# Patient Record
Sex: MALE | Race: WHITE | Employment: OTHER | URBAN - METROPOLITAN AREA
[De-identification: names, ages, dates, MRNs, and addresses within clinical notes are randomized per-mention and may not be internally consistent; named-entity substitution may affect disease eponyms.]

---

## 2017-02-13 ENCOUNTER — OP HISTORICAL/CONVERTED ENCOUNTER (OUTPATIENT)
Dept: OTHER | Age: 82
End: 2017-02-13

## 2017-03-16 ENCOUNTER — OP HISTORICAL/CONVERTED ENCOUNTER (OUTPATIENT)
Dept: OTHER | Age: 82
End: 2017-03-16

## 2017-04-03 ENCOUNTER — OP HISTORICAL/CONVERTED ENCOUNTER (OUTPATIENT)
Dept: OTHER | Age: 82
End: 2017-04-03

## 2017-08-28 ENCOUNTER — OP HISTORICAL/CONVERTED ENCOUNTER (OUTPATIENT)
Dept: OTHER | Age: 82
End: 2017-08-28

## 2017-12-15 ENCOUNTER — OP HISTORICAL/CONVERTED ENCOUNTER (OUTPATIENT)
Dept: OTHER | Age: 82
End: 2017-12-15

## 2018-08-07 ENCOUNTER — OP HISTORICAL/CONVERTED ENCOUNTER (OUTPATIENT)
Dept: OTHER | Age: 83
End: 2018-08-07

## 2019-03-12 ENCOUNTER — OP HISTORICAL/CONVERTED ENCOUNTER (OUTPATIENT)
Dept: OTHER | Age: 84
End: 2019-03-12

## 2019-07-02 ENCOUNTER — OP HISTORICAL/CONVERTED ENCOUNTER (OUTPATIENT)
Dept: OTHER | Age: 84
End: 2019-07-02

## 2019-08-22 ENCOUNTER — OP HISTORICAL/CONVERTED ENCOUNTER (OUTPATIENT)
Dept: OTHER | Age: 84
End: 2019-08-22

## 2020-02-05 ENCOUNTER — OP HISTORICAL/CONVERTED ENCOUNTER (OUTPATIENT)
Dept: OTHER | Age: 85
End: 2020-02-05

## 2020-03-03 ENCOUNTER — OP HISTORICAL/CONVERTED ENCOUNTER (OUTPATIENT)
Dept: OTHER | Age: 85
End: 2020-03-03

## 2020-07-13 ENCOUNTER — ED HISTORICAL/CONVERTED ENCOUNTER (OUTPATIENT)
Dept: OTHER | Age: 85
End: 2020-07-13

## 2020-08-24 ENCOUNTER — OP HISTORICAL/CONVERTED ENCOUNTER (OUTPATIENT)
Dept: OTHER | Age: 85
End: 2020-08-24

## 2020-10-12 ENCOUNTER — TRANSCRIBE ORDER (OUTPATIENT)
Dept: SCHEDULING | Age: 85
End: 2020-10-12

## 2020-10-12 DIAGNOSIS — R06.02 SHORTNESS OF BREATH: Primary | ICD-10-CM

## 2020-11-18 ENCOUNTER — HOSPITAL ENCOUNTER (OUTPATIENT)
Dept: CT IMAGING | Age: 85
Discharge: HOME | End: 2020-11-18
Attending: INTERNAL MEDICINE
Payer: MEDICARE

## 2020-11-18 DIAGNOSIS — R06.02 SHORTNESS OF BREATH: ICD-10-CM

## 2020-11-18 PROCEDURE — 71250 CT THORAX DX C-: CPT

## 2021-07-19 ENCOUNTER — TRANSCRIBE ORDER (OUTPATIENT)
Dept: SCHEDULING | Age: 86
End: 2021-07-19

## 2021-07-19 DIAGNOSIS — R91.1 RIGHT UPPER LOBE PULMONARY NODULE: Primary | ICD-10-CM

## 2021-11-16 ENCOUNTER — TRANSCRIBE ORDER (OUTPATIENT)
Dept: SCHEDULING | Age: 86
End: 2021-11-16

## 2021-11-16 DIAGNOSIS — R91.1 RIGHT UPPER LOBE PULMONARY NODULE: Primary | ICD-10-CM

## 2021-11-19 ENCOUNTER — HOSPITAL ENCOUNTER (OUTPATIENT)
Dept: CT IMAGING | Age: 86
Discharge: HOME | End: 2021-11-19
Attending: INTERNAL MEDICINE
Payer: MEDICARE

## 2021-11-19 DIAGNOSIS — R91.1 RIGHT UPPER LOBE PULMONARY NODULE: ICD-10-CM

## 2021-11-19 PROCEDURE — 71250 CT THORAX DX C-: CPT

## 2022-10-31 ENCOUNTER — TRANSCRIBE ORDER (OUTPATIENT)
Dept: SCHEDULING | Age: 87
End: 2022-10-31

## 2022-10-31 DIAGNOSIS — R91.1 RIGHT UPPER LOBE PULMONARY NODULE: Primary | ICD-10-CM

## 2022-11-22 ENCOUNTER — HOSPITAL ENCOUNTER (OUTPATIENT)
Dept: CT IMAGING | Age: 87
Discharge: HOME | End: 2022-11-22
Attending: INTERNAL MEDICINE
Payer: MEDICARE

## 2022-11-22 DIAGNOSIS — R91.1 RIGHT UPPER LOBE PULMONARY NODULE: ICD-10-CM

## 2022-11-22 PROCEDURE — 71250 CT THORAX DX C-: CPT

## 2022-11-30 RX ORDER — ASPIRIN 81 MG/1
TABLET ORAL DAILY
COMMUNITY

## 2022-12-05 ENCOUNTER — ANESTHESIA EVENT (OUTPATIENT)
Dept: ENDOSCOPY | Age: 87
End: 2022-12-05
Payer: MEDICARE

## 2022-12-05 ENCOUNTER — ANESTHESIA (OUTPATIENT)
Dept: ENDOSCOPY | Age: 87
End: 2022-12-05
Payer: MEDICARE

## 2022-12-05 ENCOUNTER — APPOINTMENT (OUTPATIENT)
Dept: ENDOSCOPY | Age: 87
End: 2022-12-05
Attending: INTERNAL MEDICINE
Payer: MEDICARE

## 2022-12-05 ENCOUNTER — HOSPITAL ENCOUNTER (OUTPATIENT)
Age: 87
Setting detail: OUTPATIENT SURGERY
Discharge: HOME | End: 2022-12-05
Attending: INTERNAL MEDICINE | Admitting: INTERNAL MEDICINE
Payer: MEDICARE

## 2022-12-05 VITALS
TEMPERATURE: 97.3 F | HEART RATE: 58 BPM | SYSTOLIC BLOOD PRESSURE: 171 MMHG | BODY MASS INDEX: 33.33 KG/M2 | DIASTOLIC BLOOD PRESSURE: 89 MMHG | OXYGEN SATURATION: 96 % | RESPIRATION RATE: 16 BRPM | WEIGHT: 225 LBS | HEIGHT: 69 IN

## 2022-12-05 PROCEDURE — 74011250636 HC RX REV CODE- 250/636: Performed by: NURSE PRACTITIONER

## 2022-12-05 PROCEDURE — 77030019988 HC FCPS ENDOSC DISP BSC -B: Performed by: INTERNAL MEDICINE

## 2022-12-05 PROCEDURE — 2709999900 HC NON-CHARGEABLE SUPPLY: Performed by: INTERNAL MEDICINE

## 2022-12-05 PROCEDURE — 88305 TISSUE EXAM BY PATHOLOGIST: CPT

## 2022-12-05 PROCEDURE — 76060000032 HC ANESTHESIA 0.5 TO 1 HR: Performed by: INTERNAL MEDICINE

## 2022-12-05 PROCEDURE — 76040000007: Performed by: INTERNAL MEDICINE

## 2022-12-05 PROCEDURE — 74011250636 HC RX REV CODE- 250/636: Performed by: INTERNAL MEDICINE

## 2022-12-05 RX ORDER — SODIUM CHLORIDE, SODIUM LACTATE, POTASSIUM CHLORIDE, CALCIUM CHLORIDE 600; 310; 30; 20 MG/100ML; MG/100ML; MG/100ML; MG/100ML
INJECTION, SOLUTION INTRAVENOUS
Status: DISCONTINUED | OUTPATIENT
Start: 2022-12-05 | End: 2022-12-05 | Stop reason: HOSPADM

## 2022-12-05 RX ORDER — LOSARTAN POTASSIUM 50 MG/1
TABLET ORAL DAILY
COMMUNITY

## 2022-12-05 RX ORDER — HYDROCHLOROTHIAZIDE 12.5 MG/1
12.5 TABLET ORAL DAILY
COMMUNITY

## 2022-12-05 RX ORDER — PROPOFOL 10 MG/ML
INJECTION, EMULSION INTRAVENOUS AS NEEDED
Status: DISCONTINUED | OUTPATIENT
Start: 2022-12-05 | End: 2022-12-05 | Stop reason: HOSPADM

## 2022-12-05 RX ORDER — TAMSULOSIN HYDROCHLORIDE 0.4 MG/1
0.4 CAPSULE ORAL DAILY
COMMUNITY

## 2022-12-05 RX ORDER — CHOLECALCIFEROL (VITAMIN D3) 50 MCG
CAPSULE ORAL DAILY
COMMUNITY

## 2022-12-05 RX ORDER — ALLOPURINOL 300 MG/1
TABLET ORAL DAILY
COMMUNITY

## 2022-12-05 RX ORDER — ESCITALOPRAM OXALATE 10 MG/1
10 TABLET ORAL DAILY
COMMUNITY

## 2022-12-05 RX ORDER — GABAPENTIN 300 MG/1
300 CAPSULE ORAL 3 TIMES DAILY
COMMUNITY

## 2022-12-05 RX ORDER — SODIUM CHLORIDE, SODIUM LACTATE, POTASSIUM CHLORIDE, CALCIUM CHLORIDE 600; 310; 30; 20 MG/100ML; MG/100ML; MG/100ML; MG/100ML
50 INJECTION, SOLUTION INTRAVENOUS CONTINUOUS
Status: DISCONTINUED | OUTPATIENT
Start: 2022-12-05 | End: 2022-12-05 | Stop reason: HOSPADM

## 2022-12-05 RX ORDER — LANOLIN ALCOHOL/MO/W.PET/CERES
CREAM (GRAM) TOPICAL
COMMUNITY

## 2022-12-05 RX ORDER — ATORVASTATIN CALCIUM 10 MG/1
10 TABLET, FILM COATED ORAL DAILY
COMMUNITY

## 2022-12-05 RX ADMIN — PROPOFOL 30 MG: 10 INJECTION, EMULSION INTRAVENOUS at 11:17

## 2022-12-05 RX ADMIN — SODIUM CHLORIDE, POTASSIUM CHLORIDE, SODIUM LACTATE AND CALCIUM CHLORIDE 50 ML/HR: 600; 310; 30; 20 INJECTION, SOLUTION INTRAVENOUS at 10:17

## 2022-12-05 RX ADMIN — SODIUM CHLORIDE, POTASSIUM CHLORIDE, SODIUM LACTATE AND CALCIUM CHLORIDE: 600; 310; 30; 20 INJECTION, SOLUTION INTRAVENOUS at 11:09

## 2022-12-05 RX ADMIN — PROPOFOL 50 MG: 10 INJECTION, EMULSION INTRAVENOUS at 11:13

## 2022-12-05 RX ADMIN — PROPOFOL 50 MG: 10 INJECTION, EMULSION INTRAVENOUS at 11:11

## 2022-12-05 NOTE — ANESTHESIA POSTPROCEDURE EVALUATION
Procedure(s):  COLONOSCOPY  ENDOSCOPIC POLYPECTOMY. MAC, total IV anesthesia    Anesthesia Post Evaluation      Multimodal analgesia: multimodal analgesia not used between 6 hours prior to anesthesia start to PACU discharge  Patient location during evaluation: bedside (Endoscopy suite)  Patient participation: complete - patient cannot participate  Level of consciousness: sleepy but conscious  Pain score: 0  Pain management: adequate  Airway patency: patent  Anesthetic complications: no  Cardiovascular status: acceptable  Respiratory status: acceptable and nasal cannula  Hydration status: acceptable  Comments: This patient remained on the stretcher. The patient was handed off to the endoscopy nursing team.  All questions regarding pre-, intra-, and postoperative care were answered.   Post anesthesia nausea and vomiting:  none      INITIAL Post-op Vital signs:   Vitals Value Taken Time   /86 12/05/22 1133   Temp 36.3 °C (97.3 °F) 12/05/22 1133   Pulse 74 12/05/22 1133   Resp 16 12/05/22 1133   SpO2 98 % 12/05/22 1133

## 2022-12-05 NOTE — ANESTHESIA PREPROCEDURE EVALUATION
Relevant Problems   No relevant active problems       Anesthetic History   No history of anesthetic complications            Review of Systems / Medical History  Patient summary reviewed and pertinent labs reviewed    Pulmonary        Sleep apnea    Asthma   Pertinent negatives: No smoker     Neuro/Psych   Within defined limits           Cardiovascular    Hypertension          CAD         GI/Hepatic/Renal  Within defined limits              Endo/Other        Obesity     Other Findings            Past Medical History:   Diagnosis Date    Arthritis     Asthma     CAD (coronary artery disease)     Depression     Gout     Hyperglycemia     Hyperlipidemia     Hypertension     Sleep apnea     Spinal stenosis        Past Surgical History:   Procedure Laterality Date    HX ORTHOPAEDIC      knee replacements       Current Outpatient Medications   Medication Instructions    aspirin delayed-release 81 mg tablet Oral, DAILY       No current facility-administered medications for this encounter. Current Outpatient Medications   Medication Sig    aspirin delayed-release 81 mg tablet Take  by mouth daily. No data found.     No results found for: WBC, WBCLT, HGBPOC, HGB, HGBP, HCTPOC, HCT, PHCT, RBCH, PLT, MCV, HGBEXT, HCTEXT, PLTEXT  No results found for: NA, K, CL, CO2, AGAP, GLU, BUN, CREA, BUCR, GFRAA, GFRNA, CA, GFRAA  No results found for: APTT, PTP, INR, INREXT  No results found for: GLU, GLUCPOC      Physical Exam    Airway  Mallampati: III    Neck ROM: normal range of motion        Cardiovascular    Rhythm: regular  Rate: normal         Dental         Pulmonary  Breath sounds clear to auscultation               Abdominal         Other Findings            Anesthetic Plan    ASA: 3  Anesthesia type: MAC and total IV anesthesia    Monitoring Plan: Continuous noninvasive hemodynamic monitoring      Induction: Intravenous  Anesthetic plan and risks discussed with: Patient

## 2023-01-27 ENCOUNTER — TRANSCRIBE ORDER (OUTPATIENT)
Dept: SCHEDULING | Age: 88
End: 2023-01-27

## 2023-01-27 DIAGNOSIS — R91.8 LUNG NODULES: Primary | ICD-10-CM

## 2023-02-20 ENCOUNTER — TRANSCRIBE ORDER (OUTPATIENT)
Dept: SCHEDULING | Age: 88
End: 2023-02-20

## 2023-02-20 DIAGNOSIS — M54.12 CERVICAL RADICULOPATHY: Primary | ICD-10-CM

## 2023-03-10 ENCOUNTER — TRANSCRIBE ORDER (OUTPATIENT)
Dept: SCHEDULING | Age: 88
End: 2023-03-10

## 2023-03-10 DIAGNOSIS — R91.8 LUNG NODULES: Primary | ICD-10-CM

## 2023-04-19 ENCOUNTER — OFFICE VISIT (OUTPATIENT)
Dept: PODIATRY | Age: 88
End: 2023-04-19

## 2023-04-19 VITALS
HEIGHT: 69 IN | SYSTOLIC BLOOD PRESSURE: 173 MMHG | HEART RATE: 59 BPM | TEMPERATURE: 97.3 F | DIASTOLIC BLOOD PRESSURE: 77 MMHG | WEIGHT: 225 LBS | OXYGEN SATURATION: 96 % | BODY MASS INDEX: 33.33 KG/M2

## 2023-04-19 DIAGNOSIS — L60.2 HYPERTROPHY OF NAIL: Primary | ICD-10-CM

## 2023-04-19 NOTE — PROGRESS NOTES
1. Have you been to the ER, urgent care clinic since your last visit? Hospitalized since your last visit? No    2. Have you seen or consulted any other health care providers outside of the [address] [place] [address] since your last visit? Include any pap smears or colon screening.  No    Chief Complaint   Patient presents with    Foot Exam    Ingrown Toenail     Bilateral great toe

## 2023-04-19 NOTE — PROGRESS NOTES
[facility] FOOT SURGERY     Patient Name: Evette Lucero. : 1935    Visit Date: 2023    Office Visit Note    Subjective:         Patient is a 81 y.o. male who is being seen in office initial visit for painful elongated nails. Patient states that she is unable to cut her own nails and that her nails has become thickened over time. Patient states that while she wears tight shoes her nails hurt. Past Medical History:   Diagnosis Date    Arthritis     Asthma     CAD (coronary artery disease)     Depression     Gout     Hyperglycemia     Hyperlipidemia     Hypertension     Sleep apnea     Spinal stenosis      Past Surgical History:   Procedure Laterality Date    COLONOSCOPY N/A 2022    COLONOSCOPY performed by Parker Leonard MD at [address]      knee replacements       No family history on file. Social History     Tobacco Use    Smoking status: Never    Smokeless tobacco: Never   Substance Use Topics    Alcohol use: Not Currently     Comment: have not drank in 40 years     No Known Allergies  Prior to Admission medications    Medication Sig Start Date End Date Taking? Authorizing Provider   allopurinoL (ZYLOPRIM) 300 mg tablet Take  by mouth daily. Provider, Historical   atorvastatin (Lipitor) 10 mg tablet Take 10 mg by mouth daily. Provider, Historical   escitalopram oxalate (Lexapro) 10 mg tablet Take 10 mg by mouth daily. Provider, Historical   gabapentin (NEURONTIN) 300 mg capsule Take 300 mg by mouth three (3) times daily. Provider, Historical   melatonin 3 mg tablet Take  by mouth nightly. Provider, Historical   tamsulosin (Flomax) 0.4 mg capsule Take 0.4 mg by mouth daily. Provider, Historical   omega 3-dha-epa-fish oil (Fish OiL) 100-160-1,000 mg cap Take  by mouth daily. Provider, Historical   losartan (COZAAR) 50 mg tablet Take  by mouth daily.     Provider, Historical   hydroCHLOROthiazide (HYDRODIURIL) 12.5 mg tablet Take 12.5 mg by mouth daily. Provider, Historical   timoloL (BETIMOL) 0.25 % ophthalmic solution Administer 1-2 Drops to both eyes two (2) times a day. use in affected eye(s)    Provider, Historical   aspirin delayed-release 81 mg tablet Take  by mouth daily. Provider, Historical       Review of Systems   Constitutional:  Negative for chills, diaphoresis, fever and malaise/fatigue. Respiratory:  Negative for cough, hemoptysis, sputum production, shortness of breath and wheezing. No cyanosis   Cardiovascular:  Negative for chest pain, palpitations, claudication and leg swelling. Gastrointestinal:  Negative for abdominal pain, constipation, diarrhea, heartburn, nausea and vomiting. Genitourinary:  Negative for frequency. Musculoskeletal:  Negative for back pain, joint pain, myalgias and neck pain. Skin:  Negative for itching and rash. Neurological:  Negative for tingling and headaches. Alert and oriented x 4. Endo/Heme/Allergies:  Negative for polydipsia. Psychiatric/Behavioral:  Negative for depression and memory loss. The patient is not nervous/anxious. Objective:     Visit Vitals  BP (!) 173/77 (BP 1 Location: Left upper arm, BP Patient Position: Sitting, BP Cuff Size: Adult)   Pulse (!) 59   Temp 97.3 °F (36.3 °C) (Oral)   Ht 5' 9\" (1.753 m)   Wt 225 lb (102.1 kg)   SpO2 96%   BMI 33.23 kg/m²       GEN: Pt is AAOx4 and in NAD. No dressing noted to B/L LE. No family noted at [facility]  DERM: No wounds noted to B/L LE. No dry skin noted to B/L LE. No proximal lymphatic streaking noted to B/L LE. NCSOI noted to B/L LE  VASC: Pedal pulses (DP/PT) palpable to B/L LE. CFT<3sec to all digits of B/L LE. Pedal hair growth noted to the level of the digits for B/L LE. Skin temp is warm to warm from proximal to distal for B/L LE. Neg homans/jean marie signs to B/L LE.  No varicosities or telangectasias noted to B/L LE.  NEURO: Protective and epicritic sensations grossly intact to B/L LE  MSK: (-) POP, No gross deformities. Good muscle tone and bulk noted to B/L DANYEL.  PSYCH: Cooperative with normal mood and affect     Data Review: No results found for this or any previous visit (from the past 24 hour(s)). Assessment:   Diagnoses and all orders for this visit:    1. Hypertrophy of nail         Plan:     Patient seen and evaluated in the office. It was determined that a biopsy of the nail unit would be taken for diagnostic purposes. A small portion of the nail unit (eg, plate, bed, matrix, hyponychium, proximal and lateral nail folds) was sharply removed from the right great toenail unit and sent to pathology for analysis. Anesthesia and hemostasis was utilized as needed. Wound care performed as needed. Pt tolerated well. Instructed pt that when pathology results return, will discuss tx options in more depth.              Hedy Carbajal DPM, [facility], [address] [place], [place] [address] , [address] [place] [address] [place]: [phone]  F: [phone]

## 2023-04-22 DIAGNOSIS — M54.12 CERVICAL RADICULOPATHY: Primary | ICD-10-CM

## 2023-04-22 DIAGNOSIS — R91.8 LUNG NODULES: Primary | ICD-10-CM

## 2023-04-23 DIAGNOSIS — R91.8 LUNG NODULES: Primary | ICD-10-CM

## 2023-05-04 ENCOUNTER — HOSPITAL ENCOUNTER (OUTPATIENT)
Dept: CT IMAGING | Age: 88
Discharge: HOME | End: 2023-05-04
Attending: INTERNAL MEDICINE
Payer: MEDICARE

## 2023-05-04 DIAGNOSIS — R91.8 LUNG NODULES: ICD-10-CM

## 2023-05-04 PROCEDURE — 71250 CT THORAX DX C-: CPT

## 2023-05-19 RX ORDER — TAMSULOSIN HYDROCHLORIDE 0.4 MG/1
0.4 CAPSULE ORAL DAILY
COMMUNITY

## 2023-05-19 RX ORDER — GLIMEPIRIDE 2 MG/1
1-2 TABLET ORAL 2 TIMES DAILY
COMMUNITY

## 2023-05-19 RX ORDER — ATORVASTATIN CALCIUM 10 MG/1
10 TABLET, FILM COATED ORAL DAILY
COMMUNITY

## 2023-05-19 RX ORDER — LOSARTAN POTASSIUM 50 MG/1
TABLET ORAL DAILY
COMMUNITY

## 2023-05-19 RX ORDER — ASPIRIN 81 MG/1
TABLET ORAL DAILY
COMMUNITY

## 2023-05-19 RX ORDER — LANOLIN ALCOHOL/MO/W.PET/CERES
CREAM (GRAM) TOPICAL
COMMUNITY

## 2023-05-19 RX ORDER — GABAPENTIN 300 MG/1
300 CAPSULE ORAL 3 TIMES DAILY
COMMUNITY

## 2023-05-19 RX ORDER — ESCITALOPRAM OXALATE 10 MG/1
10 TABLET ORAL DAILY
COMMUNITY

## 2023-05-19 RX ORDER — ALLOPURINOL 300 MG/1
TABLET ORAL DAILY
COMMUNITY

## 2023-05-19 RX ORDER — HYDROCHLOROTHIAZIDE 12.5 MG/1
12.5 TABLET ORAL DAILY
COMMUNITY

## 2023-05-25 RX ORDER — AMMONIUM LACTATE 12 G/100G
CREAM TOPICAL
Qty: 385 G | Refills: 4 | Status: SHIPPED | OUTPATIENT
Start: 2023-05-25 | End: 2023-06-24

## 2023-08-07 ENCOUNTER — OFFICE VISIT (OUTPATIENT)
Age: 88
End: 2023-08-07
Payer: COMMERCIAL

## 2023-08-07 VITALS
SYSTOLIC BLOOD PRESSURE: 129 MMHG | WEIGHT: 225 LBS | DIASTOLIC BLOOD PRESSURE: 61 MMHG | TEMPERATURE: 98.3 F | BODY MASS INDEX: 33.33 KG/M2 | HEIGHT: 69 IN | HEART RATE: 62 BPM

## 2023-08-07 DIAGNOSIS — L60.2 HYPERTROPHY OF NAIL: Primary | ICD-10-CM

## 2023-08-07 PROCEDURE — 99213 OFFICE O/P EST LOW 20 MIN: CPT | Performed by: PODIATRIST

## 2023-08-07 PROCEDURE — 1123F ACP DISCUSS/DSCN MKR DOCD: CPT | Performed by: PODIATRIST

## 2023-11-14 ENCOUNTER — OFFICE VISIT (OUTPATIENT)
Age: 88
End: 2023-11-14
Payer: COMMERCIAL

## 2023-11-14 VITALS
HEIGHT: 69 IN | HEART RATE: 61 BPM | SYSTOLIC BLOOD PRESSURE: 138 MMHG | DIASTOLIC BLOOD PRESSURE: 72 MMHG | WEIGHT: 225 LBS | BODY MASS INDEX: 33.33 KG/M2

## 2023-11-14 DIAGNOSIS — L60.2 HYPERTROPHY OF NAIL: Primary | ICD-10-CM

## 2023-11-14 PROCEDURE — 99213 OFFICE O/P EST LOW 20 MIN: CPT | Performed by: PODIATRIST

## 2023-11-14 PROCEDURE — 1123F ACP DISCUSS/DSCN MKR DOCD: CPT | Performed by: PODIATRIST

## 2023-12-12 ENCOUNTER — HOSPITAL ENCOUNTER (OUTPATIENT)
Facility: HOSPITAL | Age: 88
Discharge: HOME | End: 2023-12-15
Attending: INTERNAL MEDICINE
Payer: MEDICARE

## 2023-12-12 DIAGNOSIS — R91.1 RIGHT UPPER LOBE PULMONARY NODULE: ICD-10-CM

## 2023-12-12 PROCEDURE — 71250 CT THORAX DX C-: CPT

## 2024-02-14 ENCOUNTER — OFFICE VISIT (OUTPATIENT)
Age: 89
End: 2024-02-14
Payer: MEDICARE

## 2024-02-14 VITALS
DIASTOLIC BLOOD PRESSURE: 52 MMHG | SYSTOLIC BLOOD PRESSURE: 110 MMHG | HEART RATE: 64 BPM | TEMPERATURE: 97.6 F | OXYGEN SATURATION: 95 %

## 2024-02-14 DIAGNOSIS — L60.2 HYPERTROPHY OF NAIL: Primary | ICD-10-CM

## 2024-02-14 PROCEDURE — 1036F TOBACCO NON-USER: CPT | Performed by: PODIATRIST

## 2024-02-14 PROCEDURE — 1123F ACP DISCUSS/DSCN MKR DOCD: CPT | Performed by: PODIATRIST

## 2024-02-14 PROCEDURE — G8484 FLU IMMUNIZE NO ADMIN: HCPCS | Performed by: PODIATRIST

## 2024-02-14 PROCEDURE — G8417 CALC BMI ABV UP PARAM F/U: HCPCS | Performed by: PODIATRIST

## 2024-02-14 PROCEDURE — G8427 DOCREV CUR MEDS BY ELIG CLIN: HCPCS | Performed by: PODIATRIST

## 2024-02-14 PROCEDURE — 99213 OFFICE O/P EST LOW 20 MIN: CPT | Performed by: PODIATRIST

## 2024-02-14 NOTE — PROGRESS NOTES
Chief Complaint   Patient presents with    Follow-up     Hypertrophy of nail     1. Have you been to the ER, urgent care clinic since your last visit?  Hospitalized since your last visit?No    2. Have you seen or consulted any other health care providers outside of the [facility] since your last visit?  Include any pap smears or colon screening. No    BP (!) 110/52 (Site: Left Upper Arm, Position: Sitting, Cuff Size: Large Adult)   Pulse 64   Temp 97.6 °F (36.4 °C) (Temporal)   SpO2 95%

## 2024-02-19 NOTE — PROGRESS NOTES
[facility]         Patient Name: Charlotte Vargas Jr.    : 1935    Visit Date: 2024    Office Visit Note      Subjective:         Patient is a 88 y.o. male who is being seen in office follow up visit for painful elongated nails.  Patient states that she is unable to cut her own nails and that her nails has become thickened over time.  Patient states that while she wears tight shoes  her nails hurt.      Past Medical History:   Diagnosis Date    Arthritis     Asthma     CAD (coronary artery disease)     Depression     Gout     Hyperglycemia     Hyperlipidemia     Hypertension     Sleep apnea     Spinal stenosis      Past Surgical History:   Procedure Laterality Date    COLONOSCOPY N/A 2022    COLONOSCOPY performed by David Whyte MD at [facility] ENDOSCOPY    ORTHOPEDIC SURGERY      knee replacements       No family history on file.   Social History     Tobacco Use    Smoking status: Never    Smokeless tobacco: Never   Substance Use Topics    Alcohol use: Not Currently     No Known Allergies  Prior to Admission medications    Medication Sig Start Date End Date Taking? Authorizing Provider   allopurinol (ZYLOPRIM) 300 MG tablet Take by mouth daily   Yes Automatic Reconciliation, Ar   aspirin 81 MG EC tablet Take by mouth daily   Yes Automatic Reconciliation, Ar   atorvastatin (LIPITOR) 10 MG tablet Take 1 tablet by mouth daily   Yes Automatic Reconciliation, Ar   escitalopram (LEXAPRO) 10 MG tablet Take 1 tablet by mouth daily   Yes Automatic Reconciliation, Ar   gabapentin (NEURONTIN) 300 MG capsule Take 1 capsule by mouth daily.   Yes Automatic Reconciliation, Ar   losartan (COZAAR) 50 MG tablet Take 2 tablets by mouth daily   Yes Automatic Reconciliation, Ar   melatonin 3 MG TABS tablet Take 1 tablet by mouth nightly as needed   Yes Automatic Reconciliation, Ar   tamsulosin (FLOMAX) 0.4 MG capsule Take 1 capsule by mouth in the morning and at bedtime   Yes Automatic

## 2024-05-01 ENCOUNTER — HOSPITAL ENCOUNTER (OUTPATIENT)
Facility: HOSPITAL | Age: 89
Discharge: HOME | End: 2024-05-04
Attending: INTERNAL MEDICINE
Payer: MEDICARE

## 2024-05-01 DIAGNOSIS — R06.02 SHORTNESS OF BREATH: ICD-10-CM

## 2024-05-01 DIAGNOSIS — R91.8 LUNG NODULES: ICD-10-CM

## 2024-05-01 PROCEDURE — 71250 CT THORAX DX C-: CPT

## 2024-05-21 ENCOUNTER — OFFICE VISIT (OUTPATIENT)
Age: 89
End: 2024-05-21
Payer: MEDICARE

## 2024-05-21 VITALS
SYSTOLIC BLOOD PRESSURE: 126 MMHG | HEIGHT: 69 IN | BODY MASS INDEX: 33.33 KG/M2 | HEART RATE: 62 BPM | TEMPERATURE: 98.2 F | OXYGEN SATURATION: 96 % | DIASTOLIC BLOOD PRESSURE: 57 MMHG | WEIGHT: 225 LBS

## 2024-05-21 DIAGNOSIS — M79.674 PAIN IN TOES OF BOTH FEET: ICD-10-CM

## 2024-05-21 DIAGNOSIS — M79.675 PAIN IN TOES OF BOTH FEET: ICD-10-CM

## 2024-05-21 DIAGNOSIS — L60.2 HYPERTROPHY OF NAIL: Primary | ICD-10-CM

## 2024-05-21 PROCEDURE — 1036F TOBACCO NON-USER: CPT | Performed by: PODIATRIST

## 2024-05-21 PROCEDURE — G8427 DOCREV CUR MEDS BY ELIG CLIN: HCPCS | Performed by: PODIATRIST

## 2024-05-21 PROCEDURE — G8417 CALC BMI ABV UP PARAM F/U: HCPCS | Performed by: PODIATRIST

## 2024-05-21 PROCEDURE — 1123F ACP DISCUSS/DSCN MKR DOCD: CPT | Performed by: PODIATRIST

## 2024-05-21 PROCEDURE — 99213 OFFICE O/P EST LOW 20 MIN: CPT | Performed by: PODIATRIST

## 2024-05-21 RX ORDER — BUMETANIDE 1 MG/1
1 TABLET ORAL DAILY
COMMUNITY
Start: 2024-05-10

## 2024-05-21 RX ORDER — LATANOPROST 50 UG/ML
1 SOLUTION/ DROPS OPHTHALMIC DAILY
COMMUNITY

## 2024-05-21 RX ORDER — AMLODIPINE BESYLATE 10 MG/1
10 TABLET ORAL DAILY
COMMUNITY
Start: 2024-05-06

## 2024-05-21 ASSESSMENT — ENCOUNTER SYMPTOMS
DIARRHEA: 0
ABDOMINAL PAIN: 0
SHORTNESS OF BREATH: 0
VOMITING: 0

## 2024-05-21 NOTE — PROGRESS NOTES
Chief Complaint   Patient presents with    Follow-up    Nail Problem     BP (!) 126/57 (Site: Left Upper Arm, Position: Sitting, Cuff Size: Large Adult)   Pulse 62   Temp 98.2 °F (36.8 °C) (Temporal)   Ht 1.753 m (5' 9\")   Wt 102.1 kg (225 lb)   SpO2 96%   BMI 33.23 kg/m²     1. Have you been to the ER, urgent care clinic since your last visit?  Hospitalized since your last visit?No    2. Have you seen or consulted any other health care providers outside of the [facility] since your last visit?  Include any pap smears or colon screening. No

## 2024-05-21 NOTE — PROGRESS NOTES
[facility]         Patient Name: Charlotte Vargas Jr.    : 1935    Visit Date: 2024    Office Visit Note      Subjective:         Patient is a 88 y.o. male who is being seen in office follow up visit for painful elongated nails.  Patient states that she is unable to cut her own nails and that her nails has become thickened over time.  Patient states that while she wears tight shoes  her nails hurt.  Patient states he is unable to cut his nails due to lower back pain.    Past Medical History:   Diagnosis Date    Arthritis     Asthma     CAD (coronary artery disease)     Depression     Gout     Hyperglycemia     Hyperlipidemia     Hypertension     Sleep apnea     Spinal stenosis      Past Surgical History:   Procedure Laterality Date    COLONOSCOPY N/A 2022    COLONOSCOPY performed by David Whyte MD at [facility] ENDOSCOPY    ORTHOPEDIC SURGERY      knee replacements       No family history on file.   Social History     Tobacco Use    Smoking status: Never    Smokeless tobacco: Never   Substance Use Topics    Alcohol use: Not Currently     No Known Allergies  Prior to Admission medications    Medication Sig Start Date End Date Taking? Authorizing Provider   amLODIPine (NORVASC) 10 MG tablet Take 1 tablet by mouth daily 24  Yes Provider, MD Prosper   bumetanide (BUMEX) 1 MG tablet Take 1 tablet by mouth daily 5/10/24  Yes Provider, MD Prosper   latanoprost (XALATAN) 0.005 % ophthalmic solution Place 1 drop into both eyes daily   Yes Provider, MD Prosper   allopurinol (ZYLOPRIM) 300 MG tablet Take by mouth daily   Yes Automatic Reconciliation, Ar   aspirin 81 MG EC tablet Take by mouth daily   Yes Automatic Reconciliation, Ar   atorvastatin (LIPITOR) 10 MG tablet Take 1 tablet by mouth daily   Yes Automatic Reconciliation, Ar   escitalopram (LEXAPRO) 10 MG tablet Take 1 tablet by mouth daily   Yes Automatic Reconciliation, Ar   gabapentin (NEURONTIN) 300 MG capsule

## 2024-07-15 ENCOUNTER — HOSPITAL ENCOUNTER (OUTPATIENT)
Facility: HOSPITAL | Age: 89
Discharge: HOME | End: 2024-07-18
Attending: INTERNAL MEDICINE
Payer: MEDICARE

## 2024-07-15 DIAGNOSIS — R91.8 LUNG NODULES: ICD-10-CM

## 2024-07-15 PROCEDURE — 78815 PET IMAGE W/CT SKULL-THIGH: CPT

## 2024-07-15 PROCEDURE — 3430000000 HC RX DIAGNOSTIC RADIOPHARMACEUTICAL: Performed by: INTERNAL MEDICINE

## 2024-07-15 PROCEDURE — A9609 HC RX DIAGNOSTIC RADIOPHARMACEUTICAL: HCPCS | Performed by: INTERNAL MEDICINE

## 2024-07-15 RX ORDER — FLUDEOXYGLUCOSE F-18 500 MCI/ML
11.35 INJECTION INTRAVENOUS
Status: COMPLETED | OUTPATIENT
Start: 2024-07-15 | End: 2024-07-15

## 2024-07-15 RX ADMIN — FLUDEOXYGLUCOSE F-18 11.35 MILLICURIE: 500 INJECTION INTRAVENOUS at 11:55

## 2024-10-24 ENCOUNTER — HOSPITAL ENCOUNTER (OUTPATIENT)
Facility: HOSPITAL | Age: 89
Discharge: HOME | End: 2024-10-27
Attending: INTERNAL MEDICINE
Payer: MEDICARE

## 2024-10-24 DIAGNOSIS — R06.02 SHORTNESS OF BREATH: ICD-10-CM

## 2024-10-24 DIAGNOSIS — R91.8 LUNG NODULES: ICD-10-CM

## 2024-10-24 PROCEDURE — 71250 CT THORAX DX C-: CPT

## (undated) DEVICE — ENDOSCOPIC KIT 1.1+ DE BOWL

## (undated) DEVICE — MASK ANES INF SZ 2 PREM TAIL VLV INFL PRT UNSCENTED SGL PT

## (undated) DEVICE — FORCEPS BX L240CM JAW DIA2.4MM ORNG L CAP W/ NDL DISP RAD

## (undated) DEVICE — CANNULA NASAL ADULT 10FT ETCO2/CO2 VENTFLO